# Patient Record
Sex: FEMALE | Race: WHITE
[De-identification: names, ages, dates, MRNs, and addresses within clinical notes are randomized per-mention and may not be internally consistent; named-entity substitution may affect disease eponyms.]

---

## 2018-05-16 NOTE — HP
PREOPERATIVE HISTORY AND PHYSICAL:

 

DATE OF SURGERY/ADMISSION:  05/25/18 [facility]

 

DATE OF OFFICE VISIT/ENCOUNTER:  05/16/18

 

ATTENDING SURGEON:  Sheron Evans MD.* (DICTATED BY ANNI ALVAREZ)

 

PROCEDURE:  Left wrist excision of mass.

 

CHIEF COMPLAINT:  Mass, left wrist.

 

HISTORY OF PRESENT ILLNESS:  This is a 42-year-old female.  She is employed as 
a  at the [employer].  She has had pain and lump in her left 
wrist that has been ongoing for close to 2 months.  She denies any injury to 
the wrist. She feels marked pain on the volar ulnar aspect of the wrist 
especially when she applied pressure or when she extends her wrist.  Even 
lighter activity such as holding a book is very painful.  This is her dominant 
hand.  She occasionally gets a tingling sensation into her ulnar to fingers, 
but that is not present all the time.  An MRI of the left wrist shows a 
ganglion cyst at the volar radial aspect; however, this is not in area of her 
pain and she does definitely have a tender mass at the ulnar aspect of the wrist
, which is likely tenosynovitis compressing the ulnar nerve.  The patient is 
interested in having the mass removed and has consented to proceed with 
surgical intervention at this time.

 

PAST MEDICAL HISTORY:  Unremarkable.

 

PAST SURGICAL HISTORY:

1.  Left shoulder rotator cuff repair.

2.  Right knee arthroscopy.

 

CURRENT MEDICATIONS:  None.

 

ALLERGIES:  No known drug allergies.

 

FAMILY MEDICAL HISTORY:  Hypertension and cancer.

 

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 
tech. She is also going to school in [place].  She denies tobacco use and 
recreational drug use.  She does drink alcohol on regular occasion.

 

REVIEW OF SYSTEMS:  General:  Negative for fevers, chills, or night sweats.  No 
known anesthesia problems.  Negative for unexplained weight loss or gain.  HEENT
: Negative for headache, lightheadedness, syncopal episodes, or visual changes. 
Integumentary:  Negative for abrasions, lesions, or open wounds.  Cardiothoracic
: Negative for hypertension, chest pain, palpitations, or edema.  Respiratory: 
Negative for shortness of breath with exertion, chronic cough, or wheezing.  GI
: Negative for nausea, vomiting, diarrhea, constipation, or GERD.  :  
Negative for nocturia, urinary frequency, urgency, history of UTIs, or kidney 
problems. Musculoskeletal:  Positive for current complaint.  Neurologic:  
Negative for paresthesias, numbness, history of seizure, stroke, or poor 
balance.  Endocrine: Negative for diabetes or thyroid issues.  Hematologic:  
Negative for easy bruising, anemia, bleeding disorders, or history of DVT.  
Infectious Disease:  Negative for history of MRSA, hepatitis C, or HIV.

 

                               PHYSICAL EXAMINATION

 

GENERAL:  Well-developed, well-nourished 42-year-old female in no acute 
distress.

 

VITAL SIGNS:  Height is 5 feet 7 inches, weight 302 pounds.  Blood pressure 130/
72, pulse rate 95.

 

HEENT:  Normocephalic, atraumatic.  Pupils are equal, round, and reactive to 
light and accommodation.  Extraocular movements are intact.  Throat is clear.

 

NECK:  Supple.  No palpable lymph nodes.

 

PULMONARY:  Lungs are clear to auscultation bilaterally.  No wheezes, rales, or 
rhonchi.

 

CARDIOVASCULAR:  Regular rate and rhythm.  S1 and S2.  No murmurs, rubs, or 
gallops.  No edema.

 

ABDOMEN:  Positive bowel sounds, soft, nontender.

 

NEUROLOGIC:  Alert and oriented x3.  Cranial nerves II through XII are intact. 
Sensation is intact to light touch.

 

MUSCULOSKELETAL:  On exam of her left wrist, there is some mild swelling on the 
volar ulnar aspect of the wrist.  It is markedly tender to palpation just ulnar 
to the flexor carpi ulnaris.  She has a positive Tinel's sign.  She has no pain 
resisting wrist flexion, but significant pain with passive wrist extension 
especially when bearing weight in that position.  She has full motion on her 
fingers and neurovascular function is intact.

 

 IMAGING STUDIES:  X-rays AP, lateral, and oblique of the left wrist appear 
normal. MRI of the left wrist shows a volar radial wrist ganglion.

 

IMPRESSION:  Left wrist volar radial ganglion, likely tenosynovitis at the 
volar ulnar aspect.

 

PLAN:  The patient is scheduled to undergo a left wrist excision mass with Dr. Evans on 05/25/18.  She will return to the office in 10 days postop for 
followup and suture removal.  Prescription for Ultracet was e-scribed to the 
patient's pharmacy for postoperative pain management.

 

 ____________________________________ NANI ALVAREZ

 

[id number]/CPS #: [id number]

THERESA

## 2018-05-25 ENCOUNTER — HOSPITAL ENCOUNTER (OUTPATIENT)
Dept: HOSPITAL 25 - OR | Age: 43
Discharge: HOME | End: 2018-05-25
Attending: ORTHOPAEDIC SURGERY
Payer: COMMERCIAL

## 2018-05-25 VITALS — DIASTOLIC BLOOD PRESSURE: 84 MMHG | SYSTOLIC BLOOD PRESSURE: 124 MMHG

## 2018-05-25 DIAGNOSIS — D17.79: Primary | ICD-10-CM

## 2018-05-25 PROCEDURE — 81025 URINE PREGNANCY TEST: CPT

## 2018-05-25 PROCEDURE — 88304 TISSUE EXAM BY PATHOLOGIST: CPT

## 2018-05-26 NOTE — OP
DATE OF OPERATION:  05/25/18 - [facility]

 

DATE OF BIRTH:  [birth year]

 

SURGEON:  Dr. Evans.

 

ANESTHESIOLOGIST:  NANI Hook

 

ANESTHESIA:  Local MAC.

 

PRE-OP DIAGNOSIS:  Left wrist mass.

 

POST-OP DIAGNOSIS:  Left wrist mass.

 

OPERATIVE PROCEDURE:  Removal of left wrist mass.

 

ESTIMATED BLOOD LOSS:  Zero.

 

TOURNIQUET TIME:  About 25 minutes.

 

INDICATIONS FOR PROCEDURE:  Aileen is a 42-year-old female with a painful mass 
on the ulnar aspect of her left wrist.  She has symptoms of ulnar neuropathy as 
a result of it.  She presents for left wrist mass excision.

 

DESCRIPTION OF PROCEDURE:  The patient was brought to the operating room, was 
given a sedation anesthetic and local infiltration of a total of 14 cc of 1% 
plain lidocaine and 5 cc of 2% plain lidocaine.  Skin of her left upper 
extremity was prepped and draped in usual sterile fashion.  The hand and 
forearm were exsanguinated and the tourniquet elevated to 250 mmHg.  A Chevron 
incision was made centered over the mass, we dissected bluntly through the 
subcutaneous tissue.  There appeared to be a lipoma superficial to the FCU 
tendon and this was removed.  The FCU tendon was dissected out as were the 
ulnar nerve and ulnar artery.  Deep to the ulnar nerve, there was some 
tenosynovitis of the flexor tendons and this was debrided. The nerve was 
intact. There was no mass in the nerve itself. The artery did not have an 
aneurysm.  The nerve was completely decompressed and then the wound irrigated.  
Skin edges were reapproximated with 4-0 nylon suture.  The wound was dressed 
with Xeroform, 4x4, Webril and an Ace wrap.  The patient tolerated the 
procedure well and was brought to the recovery room in good condition.

 

 [id number]/CPS #: [id number]

MTDD

## 2019-10-08 ENCOUNTER — HOSPITAL ENCOUNTER (INPATIENT)
Dept: HOSPITAL 25 - AA | Age: 44
LOS: 2 days | Discharge: HOME | DRG: 403 | End: 2019-10-10
Attending: SURGERY | Admitting: SURGERY
Payer: COMMERCIAL

## 2019-10-08 DIAGNOSIS — Z82.61: ICD-10-CM

## 2019-10-08 DIAGNOSIS — F32.9: ICD-10-CM

## 2019-10-08 DIAGNOSIS — K21.9: ICD-10-CM

## 2019-10-08 DIAGNOSIS — Z23: ICD-10-CM

## 2019-10-08 DIAGNOSIS — M54.2: ICD-10-CM

## 2019-10-08 DIAGNOSIS — G89.29: ICD-10-CM

## 2019-10-08 DIAGNOSIS — Z80.9: ICD-10-CM

## 2019-10-08 DIAGNOSIS — Z72.89: ICD-10-CM

## 2019-10-08 DIAGNOSIS — Z82.49: ICD-10-CM

## 2019-10-08 DIAGNOSIS — G43.909: ICD-10-CM

## 2019-10-08 DIAGNOSIS — R11.2: ICD-10-CM

## 2019-10-08 DIAGNOSIS — Z82.62: ICD-10-CM

## 2019-10-08 DIAGNOSIS — E66.01: Primary | ICD-10-CM

## 2019-10-08 PROCEDURE — 88307 TISSUE EXAM BY PATHOLOGIST: CPT

## 2019-10-08 PROCEDURE — 0DB64Z3 EXCISION OF STOMACH, PERCUTANEOUS ENDOSCOPIC APPROACH, VERTICAL: ICD-10-PCS | Performed by: SURGERY

## 2019-10-08 PROCEDURE — 90686 IIV4 VACC NO PRSV 0.5 ML IM: CPT

## 2019-10-08 PROCEDURE — 81025 URINE PREGNANCY TEST: CPT

## 2019-10-08 PROCEDURE — 43775 LAP SLEEVE GASTRECTOMY: CPT

## 2019-10-08 RX ADMIN — FAMOTIDINE SCH MG: 10 INJECTION, SOLUTION INTRAVENOUS at 20:32

## 2019-10-08 RX ADMIN — SODIUM CHLORIDE, SODIUM LACTATE, POTASSIUM CHLORIDE, AND CALCIUM CHLORIDE SCH MLS/HR: 600; 310; 30; 20 INJECTION, SOLUTION INTRAVENOUS at 16:18

## 2019-10-08 RX ADMIN — HYDROMORPHONE HYDROCHLORIDE PRN MG: 1 INJECTION, SOLUTION INTRAMUSCULAR; INTRAVENOUS; SUBCUTANEOUS at 14:28

## 2019-10-08 RX ADMIN — FENTANYL CITRATE PRN MCG: 0.05 INJECTION, SOLUTION INTRAMUSCULAR; INTRAVENOUS at 14:46

## 2019-10-08 RX ADMIN — KETOROLAC TROMETHAMINE SCH MG: 30 INJECTION, SOLUTION INTRAMUSCULAR; INTRAVENOUS at 20:32

## 2019-10-08 RX ADMIN — SODIUM CHLORIDE, SODIUM LACTATE, POTASSIUM CHLORIDE, AND CALCIUM CHLORIDE SCH MLS/HR: 600; 310; 30; 20 INJECTION, SOLUTION INTRAVENOUS at 22:51

## 2019-10-08 RX ADMIN — HYDROMORPHONE HYDROCHLORIDE PRN MG: 1 INJECTION, SOLUTION INTRAMUSCULAR; INTRAVENOUS; SUBCUTANEOUS at 14:41

## 2019-10-08 RX ADMIN — ONDANSETRON PRN MG: 2 INJECTION INTRAMUSCULAR; INTRAVENOUS at 20:56

## 2019-10-08 RX ADMIN — HEPARIN SODIUM SCH UNITS: 5000 INJECTION INTRAVENOUS; SUBCUTANEOUS at 22:49

## 2019-10-08 RX ADMIN — FENTANYL CITRATE PRN MCG: 0.05 INJECTION, SOLUTION INTRAMUSCULAR; INTRAVENOUS at 15:19
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
## 2019-10-08 NOTE — OP
CC:  [facility]; Dr. Manuel Lin, [facility] *

 

DATE OF OPERATION:  10/08/19 - ROOM #[id number]

 

DATE OF BIRTH:  [birth year]

 

SURGEON:  Ady Alvarenga MD.

 

ASSISTANT:  Laltia Clayton NP.

 

ANESTHESIOLOGIST:  Dr. Albert Ochoa.

 

ANESTHESIA:  General endotracheal.

 

PRE-OP DIAGNOSES:  Clinically severe obesity.

 

POST-OP DIAGNOSES:  Clinically severe obesity.

 

OPERATIVE PROCEDURE:  Laparoscopic sleeve gastrectomy.

 

ESTIMATED BLOOD LOSS:  Less than 10 mL.

 

IV FLUIDS:  Crystalloid.

 

SPECIMEN:  Portion of stomach.

 

DRAINS:  None.

 

COMPLICATIONS:  None.

 

COUNTS:  The instrument, needle, and sponge counts were correct.

 

DESCRIPTION OF PROCEDURE:  The patient was brought to the operative room and 
placed on the table supine.  Sequential compression devices were placed on both 
lower extremities and general anesthesia was administered.  The abdomen was 
prepped and draped in the usual sterile fashion.  Time-out was performed.

 

Local anesthetic was infiltrated into the skin and soft tissue prior to making 
each incision.  Entry into the abdomen was through a left upper quadrant 
incision accommodating a 5-mm optical trocar.  After accessing the peritoneal 
cavity, carbon dioxide was insufflated to a pressure of 15 mmHg.  Under direct 
visualization, 12-mm bladeless trocars were placed in the right upper quadrant 
and supraumbilical midline.  5-mm trocars were placed in the left upper 
quadrant laterally.  A Guru liver retractor was placed percutaneously in 
the subxiphoid position and used to elevate the left lobe of the liver.  
Gastric anatomy was inspected and appeared to be normal.  There were some 
adhesions of the omentum to the falciform ligament that were taken down.  
Pylorus was identified, and 6 cm proximal to this on the greater curvature, the 
stomach was skeletonized with the LigaSure.  Division of the short gastrics was 
completed all the way to the gastroesophageal junction. A posterior short 
gastric vessel was divided as well.  Once the stomach was fully mobilized, a 
sleeve gastrectomy was performed over a 40 Frisian bougie with serial firings of 
the EndoGIA stapler with purple cartridges with reinforcement.  After 
completing the sleeve gastrectomy, the specimen was retrieved through the right 
upper quadrant incision using the endoscopic retrieval bag.  The Guru 
liver retractor was removed.  The ports and carbon dioxide were removed.  The 
incisions were closed with 4-0 Monocryl in subcuticular fashion.  Steri-Strips 
were applied. The patient tolerated the procedure well.  She was extubated and 
transferred to Recovery in stable condition.

 

 [id number]/[facility] #: [id number]

Burke Rehabilitation HospitalFLORESITA
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
## 2019-10-09 RX ADMIN — FAMOTIDINE SCH MG: 10 INJECTION, SOLUTION INTRAVENOUS at 20:27

## 2019-10-09 RX ADMIN — ONDANSETRON PRN MG: 2 INJECTION INTRAMUSCULAR; INTRAVENOUS at 14:12

## 2019-10-09 RX ADMIN — KETOROLAC TROMETHAMINE SCH MG: 30 INJECTION, SOLUTION INTRAMUSCULAR; INTRAVENOUS at 14:13

## 2019-10-09 RX ADMIN — KETOROLAC TROMETHAMINE SCH MG: 30 INJECTION, SOLUTION INTRAMUSCULAR; INTRAVENOUS at 20:30

## 2019-10-09 RX ADMIN — DEXTROSE MONOHYDRATE, SODIUM CHLORIDE, AND POTASSIUM CHLORIDE SCH MLS/HR: 50; 4.5; 1.49 INJECTION, SOLUTION INTRAVENOUS at 17:21

## 2019-10-09 RX ADMIN — ONDANSETRON PRN MG: 2 INJECTION INTRAMUSCULAR; INTRAVENOUS at 07:48

## 2019-10-09 RX ADMIN — FAMOTIDINE SCH MG: 10 INJECTION, SOLUTION INTRAVENOUS at 08:30

## 2019-10-09 RX ADMIN — HEPARIN SODIUM SCH UNITS: 5000 INJECTION INTRAVENOUS; SUBCUTANEOUS at 22:05

## 2019-10-09 RX ADMIN — HEPARIN SODIUM SCH UNITS: 5000 INJECTION INTRAVENOUS; SUBCUTANEOUS at 14:13

## 2019-10-09 RX ADMIN — KETOROLAC TROMETHAMINE SCH MG: 30 INJECTION, SOLUTION INTRAMUSCULAR; INTRAVENOUS at 03:01

## 2019-10-09 RX ADMIN — HEPARIN SODIUM SCH UNITS: 5000 INJECTION INTRAVENOUS; SUBCUTANEOUS at 05:36

## 2019-10-09 RX ADMIN — KETOROLAC TROMETHAMINE SCH MG: 30 INJECTION, SOLUTION INTRAMUSCULAR; INTRAVENOUS at 08:30

## 2019-10-09 RX ADMIN — SODIUM CHLORIDE, SODIUM LACTATE, POTASSIUM CHLORIDE, AND CALCIUM CHLORIDE SCH MLS/HR: 600; 310; 30; 20 INJECTION, SOLUTION INTRAVENOUS at 14:19

## 2019-10-09 RX ADMIN — ONDANSETRON PRN MG: 2 INJECTION INTRAMUSCULAR; INTRAVENOUS at 20:29

## 2019-10-09 RX ADMIN — SODIUM CHLORIDE, SODIUM LACTATE, POTASSIUM CHLORIDE, AND CALCIUM CHLORIDE SCH MLS/HR: 600; 310; 30; 20 INJECTION, SOLUTION INTRAVENOUS at 05:36

## 2019-10-09 NOTE — PN
Progress Note





- Progress Note


Date of Service: 10/09/19


SOAP: 


Subjective:


Pt Comfortable in bed, C/O Nausea and Vomiting earlier this morning x 2, 

responded well to Zofran


without complaints at time of my exam.  + Flatus + Voids[]








Objective:





 Vital Signs


 











Temp Pulse Resp BP Pulse Ox


 


 98.3 F   95   14   143/87   100 


 


 10/09/19 08:00  10/09/19 08:00  10/09/19 08:00  10/09/19 08:00  10/09/19 08:00








PE:


Chest: CTA B/L


CVS:    RRR


ABD:   Obese, Soft, hypoactive BS's


            Incisions C/D/I + incisional Tenderness


            Remaining abdominal exam non tender


EXT:    Calves soft B/L 


[]








Assessment:  42 yo obese female S/P laparoscopic sleeve gastrectomy, post 

operative nausea/vomiting responds well to zofran


[]








Plan:  Begin Bariatric clear diet, Insentive spirometry, encouraged deep 

breathing.  ambulate 


         Above D/W Dr Alvarenga


[]

## 2019-10-10 VITALS — SYSTOLIC BLOOD PRESSURE: 113 MMHG | DIASTOLIC BLOOD PRESSURE: 49 MMHG

## 2019-10-10 RX ADMIN — ONDANSETRON PRN MG: 2 INJECTION INTRAMUSCULAR; INTRAVENOUS at 02:34

## 2019-10-10 RX ADMIN — KETOROLAC TROMETHAMINE SCH MG: 30 INJECTION, SOLUTION INTRAMUSCULAR; INTRAVENOUS at 08:32

## 2019-10-10 RX ADMIN — DEXTROSE MONOHYDRATE, SODIUM CHLORIDE, AND POTASSIUM CHLORIDE SCH MLS/HR: 50; 4.5; 1.49 INJECTION, SOLUTION INTRAVENOUS at 02:28

## 2019-10-10 RX ADMIN — KETOROLAC TROMETHAMINE SCH MG: 30 INJECTION, SOLUTION INTRAMUSCULAR; INTRAVENOUS at 02:28

## 2019-10-10 RX ADMIN — FAMOTIDINE SCH MG: 10 INJECTION, SOLUTION INTRAVENOUS at 08:32

## 2019-10-10 RX ADMIN — HEPARIN SODIUM SCH UNITS: 5000 INJECTION INTRAVENOUS; SUBCUTANEOUS at 05:55

## 2019-10-10 RX ADMIN — DEXTROSE MONOHYDRATE, SODIUM CHLORIDE, AND POTASSIUM CHLORIDE SCH MLS/HR: 50; 4.5; 1.49 INJECTION, SOLUTION INTRAVENOUS at 10:25

## 2019-10-10 NOTE — DS
DISCHARGE SUMMARY:

 

DATE OF ADMISSION:  10/08/19

 

DATE OF DISCHARGE:  10/10/19

 

SURGEON:  Dr. Ady Alvarenga.* (DICTATED BY NANI JONES)

 

DISCHARGE DIAGNOSIS:  Morbid obesity.

 

REASON FOR ADMISSION:  Morbid obesity.

 

HOSPITAL COURSE:  The patient admitted on 10/08/19 for a laparoscopic sleeve 
gastrectomy.  The patient tolerated the procedure well, transferred to the short
- stay surgical unit for postoperative medical care.  The patient was 
experiencing nausea on postop day 1 and was kept due to this.  On postop day 2, 
10/10/19, the patient has been tolerating bariatric clear liquid diet with no 
problem, had been given a scolamine patch.

 

PHYSICAL EXAMINATION:  The wounds were clean, dry, and intact.  Chest was 
clear. The abdomen was soft with only some incisional tenderness.  She is 
passing flatus.

 

DISPOSITION:  The patient was being discharged to home in stable condition on 10
/10/19.

 

DISCHARGE INSTRUCTIONS:  Instructions were given regarding diet, medications, 
activity, and her followup appointment.  All questions were answered.

 

____________________________________ NANI JONES

 

[id number]/CPS #: [id number]

MTDD

## 2019-10-10 NOTE — PN
Progress Note





- Progress Note


Date of Service: 10/10/19


SOAP: 


Subjective:


Pt in NAD, feeling better.  Tolerated mohan clears, + Flatus, no nausea[]








Objective:





 Vital Signs











Temp Pulse Resp BP Pulse Ox


 


 97.8 F   78   18   113/49   99 


 


 10/10/19 11:34  10/10/19 11:34  10/10/19 11:34  10/10/19 11:34  10/10/19 11:34











 Intake & Output











 10/09/19 10/10/19 10/10/19





 22:59 06:59 14:59


 


Intake Total 


 


Output Total 1500 800 400


 


Balance -1045 195 610








PEX:


Chest: CTA B/L


CVS:   RRR


ABD:   Obese, Soft, Hypoactive BS's, incisions C/D/I


          steris in place


EXT:   Calves soft B/L, non tender[]








Assessment:


44 yo female POD 2 S/P lap sleeve stable.  Bowel function returning, tolerating 

mohan clears[]








Plan:


Continue ambulating, IS, SCD's, Mohan clear diet.  If tolerates lunch, will D/C 

home this afternoon []

## 2019-10-14 ENCOUNTER — HOSPITAL ENCOUNTER (EMERGENCY)
Dept: HOSPITAL 25 - ED | Age: 44
Discharge: HOME | End: 2019-10-14
Payer: COMMERCIAL

## 2019-10-14 VITALS — SYSTOLIC BLOOD PRESSURE: 122 MMHG | DIASTOLIC BLOOD PRESSURE: 75 MMHG

## 2019-10-14 DIAGNOSIS — F41.9: ICD-10-CM

## 2019-10-14 DIAGNOSIS — R14.0: Primary | ICD-10-CM

## 2019-10-14 DIAGNOSIS — Z79.899: ICD-10-CM

## 2019-10-14 DIAGNOSIS — F32.9: ICD-10-CM

## 2019-10-14 LAB
ALBUMIN SERPL BCG-MCNC: 4 G/DL (ref 3.2–5.2)
ALBUMIN/GLOB SERPL: 1.1 {RATIO} (ref 1–3)
ALP SERPL-CCNC: 93 U/L (ref 34–104)
ALT SERPL W P-5'-P-CCNC: 29 U/L (ref 7–52)
ANION GAP SERPL CALC-SCNC: 9 MMOL/L (ref 2–11)
AST SERPL-CCNC: 19 U/L (ref 13–39)
BASOPHILS # BLD AUTO: 0.1 10^3/UL (ref 0–0.2)
BUN SERPL-MCNC: 8 MG/DL (ref 6–24)
BUN/CREAT SERPL: 9.3 (ref 8–20)
CALCIUM SERPL-MCNC: 9.4 MG/DL (ref 8.6–10.3)
CHLORIDE SERPL-SCNC: 104 MMOL/L (ref 101–111)
EOSINOPHIL # BLD AUTO: 0.1 10^3/UL (ref 0–0.6)
GLOBULIN SER CALC-MCNC: 3.8 G/DL (ref 2–4)
GLUCOSE SERPL-MCNC: 75 MG/DL (ref 70–100)
HCO3 SERPL-SCNC: 23 MMOL/L (ref 22–32)
HCT VFR BLD AUTO: 37 % (ref 35–47)
HGB BLD-MCNC: 12.3 G/DL (ref 12–16)
INR PPP/BLD: 1.14 (ref 0.82–1.09)
LYMPHOCYTES # BLD AUTO: 2.6 10^3/UL (ref 1–4.8)
MCH RBC QN AUTO: 28 PG (ref 27–31)
MCHC RBC AUTO-ENTMCNC: 33 G/DL (ref 31–36)
MCV RBC AUTO: 85 FL (ref 80–97)
MONOCYTES # BLD AUTO: 0.7 10^3/UL (ref 0–0.8)
NEUTROPHILS # BLD AUTO: 5.7 10^3/UL (ref 1.5–7.7)
NRBC # BLD AUTO: 0 10^3/UL
NRBC BLD QL AUTO: 0.1
PLATELET # BLD AUTO: 268 10^3/UL (ref 150–450)
POTASSIUM SERPL-SCNC: 3.5 MMOL/L (ref 3.5–5)
PROT SERPL-MCNC: 7.8 G/DL (ref 6.4–8.9)
RBC # BLD AUTO: 4.4 10^6 /UL (ref 3.7–4.87)
SODIUM SERPL-SCNC: 136 MMOL/L (ref 135–145)
TROPONIN I SERPL-MCNC: 0.01 NG/ML (ref ?–0.04)
WBC # BLD AUTO: 9.2 10^3/UL (ref 3.5–10.8)

## 2019-10-14 PROCEDURE — 36415 COLL VENOUS BLD VENIPUNCTURE: CPT

## 2019-10-14 PROCEDURE — 93005 ELECTROCARDIOGRAM TRACING: CPT

## 2019-10-14 PROCEDURE — 85025 COMPLETE CBC W/AUTO DIFF WBC: CPT

## 2019-10-14 PROCEDURE — 83605 ASSAY OF LACTIC ACID: CPT

## 2019-10-14 PROCEDURE — 71046 X-RAY EXAM CHEST 2 VIEWS: CPT

## 2019-10-14 PROCEDURE — 74176 CT ABD & PELVIS W/O CONTRAST: CPT

## 2019-10-14 PROCEDURE — 71275 CT ANGIOGRAPHY CHEST: CPT

## 2019-10-14 PROCEDURE — 80053 COMPREHEN METABOLIC PANEL: CPT

## 2019-10-14 PROCEDURE — 83880 ASSAY OF NATRIURETIC PEPTIDE: CPT

## 2019-10-14 PROCEDURE — 87040 BLOOD CULTURE FOR BACTERIA: CPT

## 2019-10-14 PROCEDURE — 86140 C-REACTIVE PROTEIN: CPT

## 2019-10-14 PROCEDURE — 85610 PROTHROMBIN TIME: CPT

## 2019-10-14 PROCEDURE — 99283 EMERGENCY DEPT VISIT LOW MDM: CPT

## 2019-10-14 PROCEDURE — 84484 ASSAY OF TROPONIN QUANT: CPT
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
## 2019-10-25 ENCOUNTER — HOSPITAL ENCOUNTER (OUTPATIENT)
Dept: HOSPITAL 25 - ED | Age: 44
Setting detail: OBSERVATION
LOS: 2 days | Discharge: HOME | End: 2019-10-27
Attending: SURGERY | Admitting: SURGERY
Payer: COMMERCIAL

## 2019-10-25 DIAGNOSIS — Z79.899: ICD-10-CM

## 2019-10-25 DIAGNOSIS — R11.0: ICD-10-CM

## 2019-10-25 DIAGNOSIS — Z98.84: ICD-10-CM

## 2019-10-25 DIAGNOSIS — K21.9: Primary | ICD-10-CM

## 2019-10-25 LAB
ANION GAP SERPL CALC-SCNC: 8 MMOL/L (ref 2–11)
BASOPHILS # BLD AUTO: 0.1 10^3/UL (ref 0–0.2)
BUN SERPL-MCNC: 7 MG/DL (ref 6–24)
BUN/CREAT SERPL: 7.5 (ref 8–20)
CALCIUM SERPL-MCNC: 9.7 MG/DL (ref 8.6–10.3)
CHLORIDE SERPL-SCNC: 102 MMOL/L (ref 101–111)
EOSINOPHIL # BLD AUTO: 0.1 10^3/UL (ref 0–0.6)
GLUCOSE SERPL-MCNC: 86 MG/DL (ref 70–100)
HCO3 SERPL-SCNC: 28 MMOL/L (ref 22–32)
HCT VFR BLD AUTO: 39 % (ref 35–47)
HGB BLD-MCNC: 12.8 G/DL (ref 12–16)
LYMPHOCYTES # BLD AUTO: 2.9 10^3/UL (ref 1–4.8)
MCH RBC QN AUTO: 28 PG (ref 27–31)
MCHC RBC AUTO-ENTMCNC: 33 G/DL (ref 31–36)
MCV RBC AUTO: 85 FL (ref 80–97)
MONOCYTES # BLD AUTO: 0.7 10^3/UL (ref 0–0.8)
NEUTROPHILS # BLD AUTO: 5.6 10^3/UL (ref 1.5–7.7)
NRBC # BLD AUTO: 0 10^3/UL
NRBC BLD QL AUTO: 0.2
PLATELET # BLD AUTO: 390 10^3/UL (ref 150–450)
POTASSIUM SERPL-SCNC: 3.2 MMOL/L (ref 3.5–5)
RBC # BLD AUTO: 4.57 10^6 /UL (ref 3.7–4.87)
SODIUM SERPL-SCNC: 138 MMOL/L (ref 135–145)
WBC # BLD AUTO: 9.3 10^3/UL (ref 3.5–10.8)

## 2019-10-25 PROCEDURE — 99283 EMERGENCY DEPT VISIT LOW MDM: CPT

## 2019-10-25 PROCEDURE — 96374 THER/PROPH/DIAG INJ IV PUSH: CPT

## 2019-10-25 PROCEDURE — 36415 COLL VENOUS BLD VENIPUNCTURE: CPT

## 2019-10-25 PROCEDURE — 80048 BASIC METABOLIC PNL TOTAL CA: CPT

## 2019-10-25 PROCEDURE — 85025 COMPLETE CBC W/AUTO DIFF WBC: CPT

## 2019-10-25 PROCEDURE — 96375 TX/PRO/DX INJ NEW DRUG ADDON: CPT

## 2019-10-25 PROCEDURE — G0378 HOSPITAL OBSERVATION PER HR: HCPCS

## 2019-10-25 PROCEDURE — 74246 X-RAY XM UPR GI TRC 2CNTRST: CPT

## 2019-10-25 PROCEDURE — 96376 TX/PRO/DX INJ SAME DRUG ADON: CPT

## 2019-10-25 RX ADMIN — DEXTROSE MONOHYDRATE AND SODIUM CHLORIDE SCH MLS/HR: 5; .9 INJECTION, SOLUTION INTRAVENOUS at 23:12

## 2019-10-25 NOTE — HP
CC: , [facility]; , 
[facility] *

 

HISTORY AND PHYSICAL UPDATE:

 

DATE OF ADMISSION:  10/25/19

 

ATTENDING SURGEON:  Dr. Stanley Junior.* (DICTATED BY NNAI HERZOG)

 

CHIEF COMPLAINT:  Severe reflux.

 

HISTORY OF PRESENT ILLNESS:  This is a 43-year-old female who is 2 weeks and 3 
days status post laparoscopic sleeve gastrectomy with Dr. Alvarenga.  Her initial 
postoperative course was significant for nausea such that she stayed until 
postop day 2, after which time she was tolerating bariatric clear liquids well.
  She also had an ED visit on 10/14/19 for some shortness of breath.  Her 
workup at that time was negative and she was released.  She was seen by Dr. Alvarenga on 10/17/19 and had continued to do well with her bariatric liquid 
diet.  However, last night in the middle of the night, she noted severe burning 
in her upper chest and back of her throat and thereafter intolerance of fluids 
including water.  She admits to nausea, but denies vomiting.  She has been able 
to handle her own secretions and has not had any further vomiting or 
regurgitation.  She has no prior history of reflux nor any regular use of 
antacids.  She was seen at the [facility] 
[facility] and was seen by the nurse practitioner, Megan Samuels, who communicated 
her findings to Dr. Alvarenga.  Recommendation was made for the patient to come 
into the ED and receive IV fluids and obtain an upper GI study.  Because of the 
timing, her case was discussed with Dr. Junior and his recommendation was 
that she be admitted overnight for IV fluids, PPI and upper GI study in the 
morning with anticipated discharge thereafter on oral PPI.

 

PAST MEDICAL HISTORY:  The remainder of her past medical history is as per her 
preoperative history and physical.

 

CURRENT MEDICATIONS:  Multivitamin only.  No prescription medications.

 

DRUG ALLERGIES:  None.

 

                               PHYSICAL EXAMINATION

 

GENERAL:  Well-nourished, obese,  woman, in no acute distress, 
sitting up in chair.

 

VITAL SIGNS:  Temperature 98.4, blood pressure 114/76, pulse 85, respirations 17
, room air saturation 100%.

 

HEENT:  Mucous membranes moist.

 

LUNGS:  Clear to auscultation.  No rales or wheezes.

 

HEART:  Regular rate and rhythm.

 

ABDOMEN:  Soft.  Very minimal tenderness to palpation in the mid epigastric 
region.

 

SKIN:  Warm and dry.

 

 DIAGNOSTIC STUDIES/LAB DATA:  Labs and studies are pending.

 

Case was discussed with Dr. Junior and the charge nurse in the ED.  The 
patient is agreeable to the plan.

 

 ____________________________________ NANI HERZOG

 

[id number]/[facility] #: [id number]

THERESA

## 2019-10-26 RX ADMIN — DEXTROSE MONOHYDRATE AND SODIUM CHLORIDE SCH MLS/HR: 5; .9 INJECTION, SOLUTION INTRAVENOUS at 09:45

## 2019-10-26 RX ADMIN — DEXTROSE MONOHYDRATE AND SODIUM CHLORIDE SCH MLS/HR: 5; .9 INJECTION, SOLUTION INTRAVENOUS at 01:07

## 2019-10-26 RX ADMIN — DEXTROSE MONOHYDRATE AND SODIUM CHLORIDE SCH MLS/HR: 5; .9 INJECTION, SOLUTION INTRAVENOUS at 16:35

## 2019-10-26 NOTE — PN
Progress Note





- Progress Note


Date of Service: 10/26/19


SOAP: 


Subjective:





Feels much better- no reflux symptoms but was NPO last night


no pain








Objective:


 











Temp Pulse Resp BP Pulse Ox


 


 97.8 F   63   16   97/61   100 


 


 10/26/19 08:20  10/26/19 08:20  10/26/19 08:20  10/26/19 08:20  10/26/19 08:20








 Intake & Output











 10/24/19 10/25/19 10/26/19 10/27/19





 06:59 06:59 06:59 06:59


 


Intake Total   1651 996


 


Output Total   200 


 


Balance   1451 996


 


Weight   293 lb 


 


Intake:    


 


  IV Fluids   1651 996


 


    D5 NS   166 996


 


    NS bolus   491 


 


    banana bag   994 


 


  Oral   0 


 


Output:    


 


  Urine   200 





 


PEX:


Comfortable


Lungs are clear


Abd is soft and non-distended; No tenderness





UGI reviewed-no obstruction





Assessment:





S/P sleeve gastrectomy for morbid obesity


GERD








Plan:


Start clear liquids


PPI


If ok, d/c later today on PPI with follow up


All discussed with patient.

## 2019-10-27 VITALS — DIASTOLIC BLOOD PRESSURE: 59 MMHG | SYSTOLIC BLOOD PRESSURE: 100 MMHG

## 2019-10-27 RX ADMIN — DEXTROSE MONOHYDRATE AND SODIUM CHLORIDE SCH MLS/HR: 5; .9 INJECTION, SOLUTION INTRAVENOUS at 02:35

## 2019-10-27 NOTE — DS
CC:  [facility], Bariatric Surgery, Attn. Dr. Alvarenga *

 

DISCHARGE SUMMARY:

 

DATE OF ADMISSION:  10/25/19

 

DATE OF DISCHARGE:  10/27/19

 

PRINCIPAL DIAGNOSIS:  Nausea with reflux, status post sleeve gastrectomy.

 

PROCEDURE PERFORMED:  None.

 

DIAGNOSTIC STUDIES:  Upper GI showing no evidence of staple line leak or 
anatomic obstruction, slow clearing of the esophagus was noted.

 

CONDITION ON DISCHARGE:  Good.

 

DISPOSITION:  To home.

 

MEDICINES:

1.  Omeprazole 20 mg p.o. b.i.d.

2.  Citalopram 10 mg daily.

3.  Magnesium carbonate 355 mg 1 p.o. t.i.d.

4.  Multivitamins one daily.

5.  Zofran 4 mg ODT tablets.

 

FOLLOWUP INSTRUCTIONS:  Follow up with [facility] for her usual routine bariatric 
followup.  Diet, she was on a bariatric liquid diet.

 

HISTORY OF PRESENT ILLNESS:  Ms. Aileen Toth is a 43-year-old morbidly obese 
woman who underwent a laparoscopic sleeve gastrectomy with Dr. Alvarenga in early 
October.  She has been doing well till the last several days.  She developed 
some reflux type symptoms with difficulty swallowing and nausea.  She was seen 
in the [facility] office and was started on oral omeprazole, but still had poor oral 
pleural intake and was evaluated in the emergency room where she was felt to be 
dehydrated. Laboratory workup was otherwise unremarkable.

 

HOSPITAL COURSE:  Due to poor oral intake over the past 24 hours, she was 
admitted and rehydrated and started on oral proton-pump inhibitors.  She has 
not been on a PPI prior to admission, although this has been just started but a 
prescription had been given earlier in the day for oral administration.  She 
has not had problem with reflux preoperatively.

 

On hospital day #1 she underwent an upper GI which showed some decreased 
esophageal clearing with some tertiary wave forms, but no evidence of 
obstruction or leak.

 

Over the next 24 hours, she slowly advanced her clear liquid diet where she was 
taking small steps and tolerating this well without further nausea or reflux 
symptoms.  She was felt ready for discharge and she would continue now with her 
outpatient omeprazole therapy with the followup as described above.

 

 [id number]/[facility]: [id number]

[facility]FLORESITA

## 2019-10-27 NOTE — PN
Progress Note





- Progress Note


Date of Service: 10/27/19


SOAP: 


Subjective:





Feels much better


Taking liquids slowly now, no reflux or nausea


Ambulating








Objective:


 











Temp Pulse Resp BP Pulse Ox


 


 98.4 F   70   18   100/59   99 


 


 10/27/19 07:33  10/27/19 07:33  10/27/19 07:37  10/27/19 07:33  10/27/19 07:33








 Intake & Output











 10/25/19 10/26/19 10/27/19 10/28/19





 06:59 06:59 06:59 06:59


 


Intake Total  1651 4929 897


 


Output Total  200 400 0


 


Balance  1451 4529 897


 


Weight  293 lb  


 


Intake:    


 


  IV Fluids  1651 3839 897


 


    D5 NS  166 2839 897


 


    NS bolus  491 1000 


 


    banana bag  994  


 


  Oral  0 1090 


 


Output:    


 


  Urine  200 400 0





 


PEX:


Comfortable


Lungs are clear


Abd is soft and non-distended. Incisions CDI








Assessment:


S/P lap sleeve gastrectomy


Refl;ux/nausea--resolved on PPI--taking liquids slowly


UGI reviewed








Plan:


Clear liquids


D/C home today


Oral PPI


Instructions given


Outpatient follow up at [facility]

## 2019-11-03 ENCOUNTER — HOSPITAL ENCOUNTER (EMERGENCY)
Dept: HOSPITAL 25 - ED | Age: 44
LOS: 1 days | Discharge: HOME | End: 2019-11-04
Payer: COMMERCIAL

## 2019-11-03 DIAGNOSIS — R11.10: ICD-10-CM

## 2019-11-03 DIAGNOSIS — Z98.84: ICD-10-CM

## 2019-11-03 DIAGNOSIS — R10.9: Primary | ICD-10-CM

## 2019-11-03 LAB
ALBUMIN SERPL BCG-MCNC: 3.9 G/DL (ref 3.2–5.2)
ALBUMIN/GLOB SERPL: 1.3 {RATIO} (ref 1–3)
ALP SERPL-CCNC: 82 U/L (ref 34–104)
ALT SERPL W P-5'-P-CCNC: 23 U/L (ref 7–52)
ANION GAP SERPL CALC-SCNC: 7 MMOL/L (ref 2–11)
AST SERPL-CCNC: 23 U/L (ref 13–39)
BASOPHILS # BLD AUTO: 0.1 10^3/UL (ref 0–0.2)
BUN SERPL-MCNC: 8 MG/DL (ref 6–24)
BUN/CREAT SERPL: 10.8 (ref 8–20)
CALCIUM SERPL-MCNC: 8.8 MG/DL (ref 8.6–10.3)
CHLORIDE SERPL-SCNC: 106 MMOL/L (ref 101–111)
EOSINOPHIL # BLD AUTO: 0.1 10^3/UL (ref 0–0.6)
GLOBULIN SER CALC-MCNC: 3.1 G/DL (ref 2–4)
GLUCOSE SERPL-MCNC: 120 MG/DL (ref 70–100)
HCG SERPL QL: < 0.6 MIU/ML
HCO3 SERPL-SCNC: 25 MMOL/L (ref 22–32)
HCT VFR BLD AUTO: 36 % (ref 35–47)
HGB BLD-MCNC: 11.7 G/DL (ref 12–16)
LYMPHOCYTES # BLD AUTO: 2.3 10^3/UL (ref 1–4.8)
MCH RBC QN AUTO: 28 PG (ref 27–31)
MCHC RBC AUTO-ENTMCNC: 32 G/DL (ref 31–36)
MCV RBC AUTO: 85 FL (ref 80–97)
MONOCYTES # BLD AUTO: 0.7 10^3/UL (ref 0–0.8)
NEUTROPHILS # BLD AUTO: 7.3 10^3/UL (ref 1.5–7.7)
NRBC # BLD AUTO: 0 10^3/UL
NRBC BLD QL AUTO: 0
PLATELET # BLD AUTO: 287 10^3/UL (ref 150–450)
POTASSIUM SERPL-SCNC: 3.3 MMOL/L (ref 3.5–5)
PROT SERPL-MCNC: 7 G/DL (ref 6.4–8.9)
RBC # BLD AUTO: 4.26 10^6 /UL (ref 3.7–4.87)
SODIUM SERPL-SCNC: 138 MMOL/L (ref 135–145)
WBC # BLD AUTO: 10.5 10^3/UL (ref 3.5–10.8)

## 2019-11-03 PROCEDURE — 80053 COMPREHEN METABOLIC PANEL: CPT

## 2019-11-03 PROCEDURE — 96375 TX/PRO/DX INJ NEW DRUG ADDON: CPT

## 2019-11-03 PROCEDURE — 96361 HYDRATE IV INFUSION ADD-ON: CPT

## 2019-11-03 PROCEDURE — 99282 EMERGENCY DEPT VISIT SF MDM: CPT

## 2019-11-03 PROCEDURE — 84702 CHORIONIC GONADOTROPIN TEST: CPT

## 2019-11-03 PROCEDURE — 96374 THER/PROPH/DIAG INJ IV PUSH: CPT

## 2019-11-03 PROCEDURE — 83690 ASSAY OF LIPASE: CPT

## 2019-11-03 PROCEDURE — 36415 COLL VENOUS BLD VENIPUNCTURE: CPT

## 2019-11-03 PROCEDURE — 85025 COMPLETE CBC W/AUTO DIFF WBC: CPT
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
## 2019-11-04 VITALS — SYSTOLIC BLOOD PRESSURE: 106 MMHG | DIASTOLIC BLOOD PRESSURE: 79 MMHG
